# Patient Record
(demographics unavailable — no encounter records)

---

## 2024-11-11 NOTE — HISTORY OF PRESENT ILLNESS
[FreeTextEntry1] : 24-year-old old  female patient, CNA by profession who was told her abnormal EKG by previous primary care doctor referred for evaluation.  She does get short of breath on more than usual exertion but denies any chest pain.  She denies PND, orthopnea, diaphoresis, dizziness, palpitations, pedal edema.  No prior of diabetes, hypertension, heart murmur, rheumatic fever.  November 11, 2024   EKG was normal sinus rhythm, sinus arrhythmia, nonspecific ST-T changes

## 2024-11-11 NOTE — ASSESSMENT
[FreeTextEntry1] : Shortness of breath with abnormal EKG -I recommend echocardiography for LV size, LV thickness, LV wall motion, valvular evaluation.  I also recommended stress test to see any inducible symptoms or EKG changes.  Risk factor modification has been discussed with patient at great length.  She will be eval by me after cardiac testing